# Patient Record
Sex: FEMALE | Race: WHITE | NOT HISPANIC OR LATINO | URBAN - METROPOLITAN AREA
[De-identification: names, ages, dates, MRNs, and addresses within clinical notes are randomized per-mention and may not be internally consistent; named-entity substitution may affect disease eponyms.]

---

## 2018-09-17 NOTE — PATIENT DISCUSSION
CHALAZION 2 ON RLL: PRESCRIBED WARM COMPRESSES, EYELID SCRUBS AND MAXITROL OINTMENT BID AND WILL PRESCRIBE DOXYCYLINE 100MG 1 PO BID X 7 DAYS.

## 2018-09-17 NOTE — PATIENT DISCUSSION
New Prescription: Maxitrol (neomycin-polymyxin-dexameth): ointment: 3.5-10,000-0.1 mg-unit/g-% 1 a thin layer twice a day into right eye 09-

## 2018-09-17 NOTE — PATIENT DISCUSSION
New Prescription: doxycycline hyclate (doxycycline hyclate): tablet,delayed release (DR/EC): 100 mg 1 tablet twice a day by mouth 09-

## 2018-10-02 NOTE — PATIENT DISCUSSION
CHALAZION 2 ON RLL: resolved. PRESCRIBED WARM COMPRESSES, EYELID SCRUBS AND MAXITROL OINTMENT BID AND used DOXYCYLINE 100MG 1 PO BID X 7 DAYS. much improved.

## 2018-10-02 NOTE — PATIENT DISCUSSION
New Prescription: doxycycline hyclate (doxycycline hyclate): tablet: 100 mg 1 tablet twice a day by mouth 10-

## 2018-10-02 NOTE — PATIENT DISCUSSION
OCULAR ROSACEA, OU:  PRESCRIBE WARM COMPRESSES AND EYELID SCRUBS QD-BID, ARTIFICIAL TEARS BID-QID AND THE DAILY INTAKE OF OMEGA-3 FATTY ACIDS. PRESCRIBE DOXYCYCLINE 100MG BID X 2 WEEKS  RETURN FOR FOLLOW-UP AS SCHEDULED.

## 2018-10-03 NOTE — PATIENT DISCUSSION
Continue: doxycycline hyclate (doxycycline hyclate): tablet: 100 mg 1 tablet twice a day by mouth 10-

## 2021-05-10 ENCOUNTER — NEW PATIENT COMPREHENSIVE (OUTPATIENT)
Dept: URBAN - METROPOLITAN AREA CLINIC 52 | Facility: CLINIC | Age: 76
End: 2021-05-10

## 2021-05-10 DIAGNOSIS — H26.491: ICD-10-CM

## 2021-05-10 DIAGNOSIS — G45.3: ICD-10-CM

## 2021-05-10 DIAGNOSIS — E11.9: ICD-10-CM

## 2021-05-10 DIAGNOSIS — H40.1134: ICD-10-CM

## 2021-05-10 PROCEDURE — 76514 ECHO EXAM OF EYE THICKNESS: CPT

## 2021-05-10 PROCEDURE — 92134 CPTRZ OPH DX IMG PST SGM RTA: CPT

## 2021-05-10 PROCEDURE — 92004 COMPRE OPH EXAM NEW PT 1/>: CPT

## 2021-05-10 ASSESSMENT — KERATOMETRY
OD_K2POWER_DIOPTERS: 45.25
OS_AXISANGLE_DEGREES: 175
OS_K2POWER_DIOPTERS: 45.75
OD_AXISANGLE_DEGREES: 5
OD_K1POWER_DIOPTERS: 46.25
OS_AXISANGLE2_DEGREES: 85
OS_K1POWER_DIOPTERS: 46.50
OD_AXISANGLE2_DEGREES: 095

## 2021-05-10 ASSESSMENT — VISUAL ACUITY
OU_SC: J1+
OD_GLARE: >20/400
OS_CC: 20/70
OS_PH: 20/25-1
OU_CC: 20/30
OD_PH: 20/25-1
OD_CC: 20/40+2

## 2021-05-10 ASSESSMENT — TONOMETRY
OD_IOP_MMHG: 14
OS_IOP_MMHG: 16

## 2021-05-10 ASSESSMENT — PACHYMETRY
OD_CT_UM: 531
OS_CT_UM: 527

## 2021-05-10 NOTE — PATIENT DISCUSSION
PCO (NO TX): PCO is not visually significant. Educated patient on signs and symptoms of PCO. Continue to monitor at this time. MRX given today.

## 2021-05-10 NOTE — PATIENT DISCUSSION
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 Patient asymptomatic. Patients sister had recent carotid stenosis 100% and 80%. Recommend refer to PCP for further evaluation. Spoke to Allison with Dr. Susannah Peres office. Patient will be contacted by Dr. Susannah Peres office with appointment time for today.

## 2021-10-25 ENCOUNTER — DIAGNOSTICS - HVF/OCT (OUTPATIENT)
Dept: URBAN - METROPOLITAN AREA CLINIC 52 | Facility: CLINIC | Age: 76
End: 2021-10-25

## 2021-10-25 DIAGNOSIS — H40.1134: ICD-10-CM

## 2021-10-25 PROCEDURE — 92083 EXTENDED VISUAL FIELD XM: CPT

## 2021-10-25 PROCEDURE — 92133 CPTRZD OPH DX IMG PST SGM ON: CPT

## 2021-10-25 ASSESSMENT — KERATOMETRY
OD_K2POWER_DIOPTERS: 45.25
OS_AXISANGLE_DEGREES: 175
OS_K1POWER_DIOPTERS: 46.50
OD_AXISANGLE2_DEGREES: 095
OS_AXISANGLE2_DEGREES: 85
OD_AXISANGLE_DEGREES: 5
OD_K1POWER_DIOPTERS: 46.25
OS_K2POWER_DIOPTERS: 45.75

## 2021-10-25 NOTE — PATIENT DISCUSSION
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 Patient asymptomatic. Patients sister had recent carotid stenosis 100% and 80%. Recommend refer to PCP for further evaluation. Spoke to Allison with Dr. Tony Hewitt office. Patient will be contacted by Dr. Tony Hewitt office with appointment time for today.

## 2022-05-16 ENCOUNTER — COMPREHENSIVE EXAM (OUTPATIENT)
Dept: URBAN - METROPOLITAN AREA CLINIC 52 | Facility: CLINIC | Age: 77
End: 2022-05-16

## 2022-05-16 DIAGNOSIS — G45.3: ICD-10-CM

## 2022-05-16 DIAGNOSIS — E11.9: ICD-10-CM

## 2022-05-16 DIAGNOSIS — H26.491: ICD-10-CM

## 2022-05-16 DIAGNOSIS — H43.811: ICD-10-CM

## 2022-05-16 DIAGNOSIS — H40.1134: ICD-10-CM

## 2022-05-16 PROCEDURE — 92014 COMPRE OPH EXAM EST PT 1/>: CPT

## 2022-05-16 PROCEDURE — 92015 DETERMINE REFRACTIVE STATE: CPT

## 2022-05-16 PROCEDURE — 92134 CPTRZ OPH DX IMG PST SGM RTA: CPT

## 2022-05-16 ASSESSMENT — KERATOMETRY
OS_K1POWER_DIOPTERS: 46.50
OS_AXISANGLE2_DEGREES: 85
OS_K2POWER_DIOPTERS: 45.75
OD_K1POWER_DIOPTERS: 46.25
OD_AXISANGLE_DEGREES: 5
OS_AXISANGLE_DEGREES: 175
OD_AXISANGLE2_DEGREES: 095
OD_K2POWER_DIOPTERS: 45.25

## 2022-05-16 ASSESSMENT — VISUAL ACUITY
OS_SC: 20/200+1
OS_SC: J1+
OD_SC: 20/40
OD_GLARE: 20/60
OD_GLARE: 20/30
OU_SC: 20/30
OD_SC: J5
OS_PH: 20/25
OD_PH: 20/20-1
OU_SC: J1+

## 2022-05-16 ASSESSMENT — TONOMETRY
OD_IOP_MMHG: 14
OS_IOP_MMHG: 14
OD_IOP_MMHG: 15
OS_IOP_MMHG: 15

## 2022-05-16 NOTE — PATIENT DISCUSSION
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 Patient asymptomatic. Patients sister had recent carotid stenosis 100% and 80%. Recommend refer to PCP for further evaluation. Spoke to Allison with Dr. Astrid Sarkar office. Patient will be contacted by Dr. Astrid Sarkar office with appointment time for today.

## 2022-11-21 ENCOUNTER — FOLLOW UP (OUTPATIENT)
Dept: URBAN - METROPOLITAN AREA CLINIC 52 | Facility: CLINIC | Age: 77
End: 2022-11-21

## 2022-11-21 DIAGNOSIS — H40.1134: ICD-10-CM

## 2022-11-21 PROCEDURE — 92133 CPTRZD OPH DX IMG PST SGM ON: CPT

## 2022-11-21 PROCEDURE — 92012 INTRM OPH EXAM EST PATIENT: CPT

## 2022-11-21 PROCEDURE — 92083 EXTENDED VISUAL FIELD XM: CPT

## 2022-11-21 ASSESSMENT — VISUAL ACUITY
OD_PH: 20/25
OU_SC: 20/40-2
OS_PH: 20/25-2
OS_SC: 20/100
OD_SC: 20/50-2

## 2022-11-21 ASSESSMENT — TONOMETRY
OS_IOP_MMHG: 14
OD_IOP_MMHG: 15
OD_IOP_MMHG: 14
OS_IOP_MMHG: 15

## 2022-11-21 NOTE — PATIENT DISCUSSION
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 Patient asymptomatic. Patients sister had recent carotid stenosis 100% and 80%. Recommend refer to PCP for further evaluation. Spoke to Allison with Dr. Cameron Fajardo office. Patient will be contacted by Dr. Cameron Fajardo office with appointment time for today.

## 2022-11-21 NOTE — PATIENT DISCUSSION
IOP 14/14 adjust to 15/15 with current drop therapy. HVF/RNFL reviewed with patient in office.  Continue Latanoprost OU Qhs. RTC in 6 months for comprehensive exam.

## 2023-07-10 ENCOUNTER — COMPREHENSIVE EXAM (OUTPATIENT)
Dept: URBAN - METROPOLITAN AREA CLINIC 52 | Facility: CLINIC | Age: 78
End: 2023-07-10

## 2023-07-10 DIAGNOSIS — E11.9: ICD-10-CM

## 2023-07-10 DIAGNOSIS — H43.811: ICD-10-CM

## 2023-07-10 DIAGNOSIS — H26.491: ICD-10-CM

## 2023-07-10 DIAGNOSIS — G45.3: ICD-10-CM

## 2023-07-10 DIAGNOSIS — H40.1132: ICD-10-CM

## 2023-07-10 DIAGNOSIS — H47.091: ICD-10-CM

## 2023-07-10 DIAGNOSIS — H52.4: ICD-10-CM

## 2023-07-10 PROCEDURE — 92014 COMPRE OPH EXAM EST PT 1/>: CPT

## 2023-07-10 PROCEDURE — 92015 DETERMINE REFRACTIVE STATE: CPT

## 2023-07-10 PROCEDURE — 92134 CPTRZ OPH DX IMG PST SGM RTA: CPT

## 2023-07-10 ASSESSMENT — KERATOMETRY
OS_AXISANGLE2_DEGREES: 163
OS_AXISANGLE_DEGREES: 73
OS_K1POWER_DIOPTERS: 46.00
OD_AXISANGLE2_DEGREES: 9
OD_K1POWER_DIOPTERS: 45.00
OD_K2POWER_DIOPTERS: 46.75
OS_K2POWER_DIOPTERS: 46.50
OD_AXISANGLE_DEGREES: 99

## 2023-07-10 ASSESSMENT — VISUAL ACUITY
OD_SC: 20/60-2
OU_SC: J1+-1@16"
OS_PH: 20/30
OD_GLARE: 20/25-1
OD_PH: 20/25-2
OU_SC: 20/40
OD_GLARE: 20/25
OS_SC: 20/200

## 2023-07-10 ASSESSMENT — TONOMETRY
OS_IOP_MMHG: 13
OD_IOP_MMHG: 15
OS_IOP_MMHG: 14
OD_IOP_MMHG: 14

## 2023-12-18 ENCOUNTER — FOLLOW UP (OUTPATIENT)
Dept: URBAN - METROPOLITAN AREA CLINIC 52 | Facility: CLINIC | Age: 78
End: 2023-12-18

## 2023-12-18 DIAGNOSIS — H40.1132: ICD-10-CM

## 2023-12-18 DIAGNOSIS — H47.091: ICD-10-CM

## 2023-12-18 PROCEDURE — 99213 OFFICE O/P EST LOW 20 MIN: CPT

## 2023-12-18 ASSESSMENT — KERATOMETRY
OS_K2POWER_DIOPTERS: 46.50
OS_AXISANGLE_DEGREES: 73
OD_AXISANGLE2_DEGREES: 9
OS_AXISANGLE2_DEGREES: 163
OD_AXISANGLE_DEGREES: 99
OD_K2POWER_DIOPTERS: 46.75
OS_K1POWER_DIOPTERS: 46.00
OD_K1POWER_DIOPTERS: 45.00

## 2023-12-18 ASSESSMENT — VISUAL ACUITY
OU_SC: 20/70
OD_SC: 20/70
OS_SC: 20/200
OD_PH: 20/30
OS_PH: 20/30

## 2023-12-18 ASSESSMENT — TONOMETRY
OD_IOP_MMHG: 13
OS_IOP_MMHG: 12
OD_IOP_MMHG: 12
OS_IOP_MMHG: 13

## 2024-06-24 ENCOUNTER — COMPREHENSIVE EXAM (OUTPATIENT)
Dept: URBAN - METROPOLITAN AREA CLINIC 52 | Facility: CLINIC | Age: 79
End: 2024-06-24

## 2024-06-24 DIAGNOSIS — H26.491: ICD-10-CM

## 2024-06-24 DIAGNOSIS — H43.811: ICD-10-CM

## 2024-06-24 DIAGNOSIS — H52.4: ICD-10-CM

## 2024-06-24 DIAGNOSIS — E11.9: ICD-10-CM

## 2024-06-24 DIAGNOSIS — G45.3: ICD-10-CM

## 2024-06-24 DIAGNOSIS — H40.1132: ICD-10-CM

## 2024-06-24 PROCEDURE — 92015 DETERMINE REFRACTIVE STATE: CPT

## 2024-06-24 PROCEDURE — 92083 EXTENDED VISUAL FIELD XM: CPT

## 2024-06-24 PROCEDURE — 92133 CPTRZD OPH DX IMG PST SGM ON: CPT

## 2024-06-24 PROCEDURE — 99214 OFFICE O/P EST MOD 30 MIN: CPT

## 2024-06-24 ASSESSMENT — VISUAL ACUITY
OD_CC: 20/20
OS_CC: 20/25
OU_SC: J1+-1@16"

## 2024-06-24 ASSESSMENT — KERATOMETRY
OS_AXISANGLE_DEGREES: 73
OS_K1POWER_DIOPTERS: 46.00
OD_AXISANGLE_DEGREES: 99
OD_K2POWER_DIOPTERS: 46.75
OD_K1POWER_DIOPTERS: 45.00
OD_AXISANGLE2_DEGREES: 9
OS_AXISANGLE2_DEGREES: 163
OS_K2POWER_DIOPTERS: 46.50

## 2024-06-24 ASSESSMENT — TONOMETRY
OS_IOP_MMHG: 11
OS_IOP_MMHG: 12
OD_IOP_MMHG: 11
OD_IOP_MMHG: 12

## 2024-12-30 ENCOUNTER — FOLLOW UP (OUTPATIENT)
Age: 79
End: 2024-12-30

## 2024-12-30 DIAGNOSIS — H26.491: ICD-10-CM

## 2024-12-30 DIAGNOSIS — H47.091: ICD-10-CM

## 2024-12-30 DIAGNOSIS — H40.1132: ICD-10-CM

## 2024-12-30 PROCEDURE — 99213 OFFICE O/P EST LOW 20 MIN: CPT

## 2025-06-16 ENCOUNTER — COMPREHENSIVE EXAM (OUTPATIENT)
Age: 80
End: 2025-06-16

## 2025-06-16 DIAGNOSIS — G45.3: ICD-10-CM

## 2025-06-16 DIAGNOSIS — H43.811: ICD-10-CM

## 2025-06-16 DIAGNOSIS — E11.9: ICD-10-CM

## 2025-06-16 DIAGNOSIS — H52.4: ICD-10-CM

## 2025-06-16 DIAGNOSIS — H40.1132: ICD-10-CM

## 2025-06-16 DIAGNOSIS — H26.491: ICD-10-CM

## 2025-06-16 PROCEDURE — 99214 OFFICE O/P EST MOD 30 MIN: CPT

## 2025-06-16 PROCEDURE — 92083 EXTENDED VISUAL FIELD XM: CPT

## 2025-06-16 PROCEDURE — 92015 DETERMINE REFRACTIVE STATE: CPT

## 2025-06-16 PROCEDURE — 92133 CPTRZD OPH DX IMG PST SGM ON: CPT
